# Patient Record
Sex: FEMALE | Race: WHITE
[De-identification: names, ages, dates, MRNs, and addresses within clinical notes are randomized per-mention and may not be internally consistent; named-entity substitution may affect disease eponyms.]

---

## 2017-01-04 ENCOUNTER — HOSPITAL ENCOUNTER (OUTPATIENT)
Dept: HOSPITAL 80 - FIMAGING | Age: 72
End: 2017-01-04
Attending: INTERNAL MEDICINE
Payer: COMMERCIAL

## 2017-01-04 DIAGNOSIS — Z78.0: ICD-10-CM

## 2017-01-04 DIAGNOSIS — Z90.5: ICD-10-CM

## 2017-01-04 DIAGNOSIS — Z90.710: ICD-10-CM

## 2017-01-04 DIAGNOSIS — M85.80: ICD-10-CM

## 2017-01-04 DIAGNOSIS — Z13.820: Primary | ICD-10-CM

## 2017-01-04 NOTE — DX
DEXA Bone Mineral Densitometry    

 

Clinical Indications:  Postmenopausal, post hysterectomy and nephrectomy, screening for osteoporosis

 

Comparison: None

 

Technique:  Bone Mineral Densitometry (BMD) by Dual Energy X-Ray Absorptiometry (DEXA) was performed 
utilizing the [employer] scanner.  The lumbar spine was evaluated in the AP projection.  The bilat
eral hips and  forearm were evaluated in the AP projection. Vertebral fracture assessment was also pe
rformed.

 

AP Lumbar Spine:  The L1 and L2 vertebral bodies were evaluated.  L3 and L4 are excluded due to degen
erative sclerosis.

BMD:  0.988 gm/cm2 

T-score:  -1.5 SD 

Z-score: 0.5 SD

 

 

AP Left Hip: Neck

BMD:  0.764 gm/cm2

T-score:  -2.0 SD 

Z-score:  0 SD

 

 

AP Right Hip: Neck

BMD:  0.733 gm/cm2

T-score:  -2.2 SD 

Z-score:  -0.2 SD

 

 

AP Left Forearm, 1/3:  

BMD:  0.725 gm/cm2

T-score:  -1.7 SD

Z-score:  0.4 SD 

 

 

Vertebral Fracture Assessment:  No significant fracture deformity. Atherosclerotic calcification of t
he abdominal aorta likely increases lumbar BMD.

 

Conclusion: Considering the lowest measured site, the patient  has low bone density. 

The ten year FRAX risk for any major osteoporotic fracture  is 13.2% and for a hip fracture is 3.4%.A
ccording to the recommendations of the National Osteoporosis Foundation, this patient would be a good
 candidate for bone strengthening pharmacologic intervention.

Any bone loss in this patient is probably related to aging or estrogen deficiency.

 

To prevent osteoporosis and to promote the patient's bone density, the following recommendations rafaela
ld be considered:

1.  Pursue a regular regimen of weightbearing and muscle strengthening exercises in order to reduce t
he risk of falls and fractures (as tolerated by the patient's general medical condition).

2. Ensure that daily dietary calcium uptake is maximized.

3. Consider checking the serum vitamin D level. Ensure that intake of vitamin D is   800 IU per day (
for ages 71 and older).

4.  Consider follow-up DEXA scan in one year if the patient begins pharmacologic intervention, or in 
2 years if she follows a more conservative approach, to assess the rate of bone loss in this patient.

## 2017-11-28 ENCOUNTER — HOSPITAL ENCOUNTER (OUTPATIENT)
Dept: HOSPITAL 80 - FIMAGING | Age: 72
End: 2017-11-28
Attending: INTERNAL MEDICINE
Payer: COMMERCIAL

## 2017-11-28 DIAGNOSIS — Z12.31: Primary | ICD-10-CM

## 2017-11-28 DIAGNOSIS — Z80.3: ICD-10-CM

## 2017-11-28 PROCEDURE — G0202 SCR MAMMO BI INCL CAD: HCPCS

## 2018-06-11 ENCOUNTER — HOSPITAL ENCOUNTER (INPATIENT)
Dept: HOSPITAL 80 - FED | Age: 73
LOS: 4 days | Discharge: HOME | DRG: 690 | End: 2018-06-15
Attending: INTERNAL MEDICINE | Admitting: INTERNAL MEDICINE
Payer: COMMERCIAL

## 2018-06-11 DIAGNOSIS — N30.90: Primary | ICD-10-CM

## 2018-06-11 DIAGNOSIS — F10.20: ICD-10-CM

## 2018-06-11 DIAGNOSIS — R19.7: ICD-10-CM

## 2018-06-11 DIAGNOSIS — K22.2: ICD-10-CM

## 2018-06-11 DIAGNOSIS — N17.9: ICD-10-CM

## 2018-06-11 DIAGNOSIS — T39.395A: ICD-10-CM

## 2018-06-11 DIAGNOSIS — K25.9: ICD-10-CM

## 2018-06-11 DIAGNOSIS — M79.7: ICD-10-CM

## 2018-06-11 DIAGNOSIS — I10: ICD-10-CM

## 2018-06-11 DIAGNOSIS — E86.0: ICD-10-CM

## 2018-06-11 LAB — PLATELET # BLD: 89 10^3/UL (ref 150–400)

## 2018-06-11 PROCEDURE — C1726 CATH, BAL DIL, NON-VASCULAR: HCPCS

## 2018-06-11 RX ADMIN — SODIUM CHLORIDE SCH MLS: 900 INJECTION, SOLUTION INTRAVENOUS at 14:40

## 2018-06-11 NOTE — PDMN
Medical Necessity


Medical necessity: MCG: M160 sepsis and other febrile illness, without focal 

infection:  A-3 days;  dehydration that is sever- CR 3.6, WBC 25, hypotension, 

UTI, Diarrhea,  further monitoring and tx needed anticipate > 2 midnights.

## 2018-06-11 NOTE — GHP
[f rep st]



                                                            HISTORY AND PHYSICAL





DATE OF ADMISSION:  06/11/2018



CHIEF COMPLAINT:  Weakness.



HISTORY OF PRESENT ILLNESS:  A 72-year-old female with a history of hypertension.  She also seems to 
have a history of chronic dysphagia and possibly esophageal stricture, although she on her most recen
t visit to gastrologist, she refused to have an EGD done.  About 10 days ago, she developed significa
nt diarrhea.  She has had decreased p.o. intake.  No nausea or vomiting.  No abdominal pain.  She the
n worsened and was not eating anything or drinking any water.  Toward the last few days, she had some
 dysuria as well.  She is not having any fevers or chills.



REVIEW OF SYSTEMS:  10-point review of systems is negative.



PAST MEDICAL HISTORY:  Hypertension.



MEDICATIONS:  Atenolol.



SOCIAL HISTORY:  No smoking, some alcohol.



FAMILY HISTORY:  Reviewed and noncontributory.



PHYSICAL EXAMINATION:  VITAL SIGNS:  Afebrile.  Blood pressure is 109/65, heart rate 74, oxygen satur
ation 97% on room air.  GENERAL:  The patient is well developed in no apparent distress.  HEENT:  Non
icteric sclerae.  Extraocular movements intact.  Dry mucous membranes.  NECK:  Supple.  No thyromegal
y.  LUNGS:  Good effort.  Clear to auscultation bilaterally.  CARDIOVASCULAR:  Regular rate and rhyth
m.  No murmurs, gallops.  ABDOMEN:  Positive bowel sounds.  Soft, nontender, slightly distended.  No 
hepatosplenomegaly.  EXTREMITIES:  No clubbing, cyanosis, or edema.  SKIN:  Without rash, warm, intac
t.  NEUROLOGIC:  Alert and oriented x3.  Moving all 4 extremities equally.  PSYCH:  Normal mood and a
ffect.



LABS:  White blood cell count 25, hemoglobin 10.  Platelets are 89.  Sodium 131, potassium 93.  BUN i
s 145, creatinine 3.6.  Apparently last year her LFTs were also elevated in an alcohol manner.  UA sh
ows leukocyte esterase 3+, white blood cell count 15,082.



TEST DATA:  EKG shows normal sinus rhythm.  No acute changes.



ASSESSMENT:  This is a 72-year-old female, presenting with diarrhea, urinary tract infection.



PLAN:  

1.  Urinary tract infection.  I do not know if this is the primary problem or this occurred because o
f the dehydration.  We will treat with antibiotics.

2.  Diarrhea.  Will check a GI pathogen panel.

3.  Thrombocytopenia, anemia, elevated liver function tests.  This seems to be consistent with alcoho
l.  I will have to talk to her tomorrow about her alcohol use.  We will recheck her LFTs.  Will also 
check a liver ultrasound.

4.  History of falling.  Will check a vitamin B12 level.  Could be due to alcoholism.  

5.  Hypertension.  Will hold atenolol for today.  Restart when her blood pressure is a little bit bet
ter.



CODE STATUS:  Patient is full code.





Job #:  [id number]

## 2018-06-11 NOTE — EDPHY
H & P


Stated Complaint: n/v/d


Time Seen by Provider: 06/11/18 12:16


HPI/ROS: 





CHIEF COMPLAINT:  Nausea, vomiting, diarrhea, dysuria





HISTORY OF PRESENT ILLNESS:  The patient presents the ED with a 10 day history 

of intermittent diarrhea, dysuria, decreased appetite and dehydration.  The 

patient denies any fever or flank pain.  She vomited once earlier in the week.  

The patient denies any history of recent fall or trauma.  She has no complaints 

of headache, neck pain, difficulty breathing or other acute complaints.  The 

patient has a history of hypertension.  She denies recent surgical history. 





REVIEW OF SYSTEMS:


A comprehensive 10 point review of systems is otherwise negative aside from 

elements mentioned in the history of present illness.








Source: Patient





- Personal History


Current Tetanus/Diphtheria Vaccine: Yes


Current Tetanus Diphtheria and Acellular Pertussis (TDAP): Yes





- Medical/Surgical History


Hx Asthma: No


Hx Chronic Respiratory Disease: No


Hx Diabetes: No


Hx Cardiac Disease: No


Hx Renal Disease: No


Hx Cirrhosis: No


Hx Alcoholism: No


Hx HIV/AIDS: No


Hx Splenectomy or Spleen Trauma: No


Other PMH: HTN,





- Social History


Smoking Status: Never smoked





- Physical Exam


Exam: 





General Appearance:  Elderly female, no acute distress


Eyes:  Pupils equal and round no pallor or injection


ENT, Mouth:  Dry mucous membranes


Respiratory:  There are no retractions, lungs are clear to auscultation


Cardiovascular:  Regular rate and rhythm


Gastrointestinal:  Abdomen is soft and nontender, no masses, bowel sounds normal


Neurological:  5/5 strength all 4 extremities


Skin:  Warm and dry, no rashes


Musculoskeletal:  Neck is supple nontender


Extremities:  symmetrical, full range of motion


Psychiatric:  Patient is oriented X 3, there is no agitation


Constitutional: 


 Initial Vital Signs











Temperature (C)  36.4 C   06/11/18 11:29


 


Heart Rate  81   06/11/18 11:29


 


Respiratory Rate  24 H  06/11/18 11:29


 


Blood Pressure  87/47 L  06/11/18 11:29


 


O2 Sat (%)  96   06/11/18 11:29








 











O2 Delivery Mode               Room Air














Allergies/Adverse Reactions: 


 





No Known Allergies Allergy (Unverified 06/11/18 11:28)


 








Home Medications: 














 Medication  Instructions  Recorded


 


Aspirin [Aspirin 81mg] 81 mg PO DAILY 02/15/12


 


Atenolol  06/11/18


 


Ibuprofen  06/11/18


 


Multivitamins  06/11/18














Medical Decision Making


ED Course/Re-evaluation: 





The patient presents to the ED with dehydration in the setting of a urinary 

tract infection.  The patient is afebrile and in no acute distress.  The 

patient does have leukocytosis but no fever, tachypnea or tachycardia.  She has 

a reassuring initial venous lactate.  Clinical presentation is one consistent 

with dehydration and UTI and not severe sepsis.  The patient did have blood 

cultures obtained is emergency department.  She received aggressive fluid 

rehydration.  The patient is started on IV ceftriaxone.





Her EKG demonstrates no evidence of an arrhythmia.





Consultation is made with Dr. Garcia from the hospitalist service who will 

admit the patient.








Differential Diagnosis: 





Differential diagnosis considered includes dehydration, metabolic abnormality, 

renal failure, severe sepsis, pyelonephritis


Critical Care Time: 





Critical care time exclusive of procedures and exclusive of the PA's time was 

35 minutes, performed by myself, Ozzy Saul MD.  The patient presents to 

the ED with dehydration, acute renal failure and a urinary tract infection.  

She required aggressive fluid rehydration in the emergency department.  Patient 

will be admitted to the hospital in a setting of her acute renal failure and 

infection.











- Data Points


Laboratory Results: 


 Laboratory Results





 06/11/18 12:00 





 06/11/18 12:00 





 











  06/11/18 06/11/18 06/11/18





  12:40 12:00 12:00


 


WBC      





    


 


RBC      





    


 


Hgb      





    


 


Hct      





    


 


MCV      





    


 


MCH      





    


 


MCHC      





    


 


RDW      





    


 


Plt Count      





    


 


MPV      





    


 


Neut % (Auto)      





    


 


Lymph % (Auto)      





    


 


Mono % (Auto)      





    


 


Eos % (Auto)      





    


 


Baso % (Auto)      





    


 


Nucleat RBC Rel Count      





    


 


Absolute Neuts (auto)      





    


 


Absolute Lymphs (auto)      





    


 


Absolute Monos (auto)      





    


 


Absolute Eos (auto)      





    


 


Absolute Basos (auto)      





    


 


Absolute Nucleated RBC      





    


 


Immature Gran %      





    


 


Seg Neutrophils %      





    


 


Band Neutrophils %      





    


 


Lymphocytes %      





    


 


Monocytes %      





    


 


Eosinophils %      





    


 


Basophils %      





    


 


Metamyelocytes %      





    


 


Myelocytes %      





    


 


Promyelocytes %      





    


 


Blast Cells %      





    


 


Immature Gran #      





    


 


Absolute Seg Neuts      





    


 


Absolute Band Neuts      





    


 


Absolute Lymphocytes      





    


 


Absolute Monocytes      





    


 


Absolute Eosinophils      





    


 


Absolute Basophils      





    


 


Absolute Metamyelocyte      





    


 


Absolute Myelocytes      





    


 


Absolute Promyelocytes      





    


 


Absolute Plasma Cells      





    


 


RBC/WBC/PLT Morphology      





    


 


Absolute Blast Cells      





    


 


Plasma Cells %      





    


 


Platelet Estimate      





    


 


VBG Lactic Acid  1.8 mmol/L mmol/L    





   (0.7-2.1)   


 


Sodium      131 mEq/L L mEq/L





     (135-145) 


 


Potassium      3.3 mEq/L mEq/L





     (3.3-5.0) 


 


Chloride      93 mEq/L L mEq/L





     () 


 


Carbon Dioxide      18 mEq/l L mEq/l





     (22-31) 


 


Anion Gap      20 mEq/L H mEq/L





     (8-16) 


 


BUN      145 mg/dL H* mg/dL





     (7-23) 


 


Creatinine      3.6 mg/dL H mg/dL





     (0.6-1.0) 


 


Estimated GFR      12 





    


 


Glucose      139 mg/dL H mg/dL





     () 


 


Calcium      8.4 mg/dL L mg/dL





     (8.5-10.4) 


 


Urine Color    YELLOW   





    


 


Urine Appearance    MODERATELY TURBID   





    


 


Urine pH    5.0   





    (5.0-7.5)  


 


Ur Specific Gravity    1.010   





    (1.002-1.030)  


 


Urine Protein    NEGATIVE   





    (NEGATIVE)  


 


Urine Ketones    NEGATIVE   





    (NEGATIVE)  


 


Urine Blood    2+  H   





    (NEGATIVE)  


 


Urine Nitrate    NEGATIVE   





    (NEGATIVE)  


 


Urine Bilirubin    NEGATIVE   





    (NEGATIVE)  


 


Urine Urobilinogen    NEGATIVE EU EU  





    (0.2-1.0)  


 


Ur Leukocyte Esterase    3+  H   





    (NEGATIVE)  


 


Urine RBC    3-5 /hpf H /hpf  





    (0-3)  


 


Urine WBC     /hpf H /hpf  





    (0-3)  


 


Ur Epithelial Cells    TRACE /lpf /lpf  





    (NONE-1+)  


 


Urine Bacteria    3+ /hpf H /hpf  





    (NONE SEEN)  


 


Urine Glucose    NEGATIVE   





    (NEGATIVE)  














  06/11/18





  12:00


 


WBC  25.72 10^3/uL H 10^3/uL





   (3.80-9.50) 


 


RBC  3.13 10^6/uL L 10^6/uL





   (4.18-5.33) 


 


Hgb  10.9 g/dL L g/dL





   (12.6-16.3) 


 


Hct  30.0 % L %





   (38.0-47.0) 


 


MCV  95.8 fL fL





   (81.5-99.8) 


 


MCH  34.8 pg H pg





   (27.9-34.1) 


 


MCHC  36.3 g/dL g/dL





   (32.4-36.7) 


 


RDW  13.8 % %





   (11.5-15.2) 


 


Plt Count  89 10^3/uL L 10^3/uL





   (150-400) 


 


MPV  10.5 fL fL





   (8.7-11.7) 


 


Neut % (Auto)  Not Reported 





  


 


Lymph % (Auto)  Not Reported 





  


 


Mono % (Auto)  Not Reported 





  


 


Eos % (Auto)  Not Reported 





  


 


Baso % (Auto)  Not Reported 





  


 


Nucleat RBC Rel Count  Not Reported 





  


 


Absolute Neuts (auto)  Not Reported 





  


 


Absolute Lymphs (auto)  Not Reported 





  


 


Absolute Monos (auto)  Not Reported 





  


 


Absolute Eos (auto)  Not Reported 





  


 


Absolute Basos (auto)  Not Reported 





  


 


Absolute Nucleated RBC  Not Reported 





  


 


Immature Gran %  Not Reported 





  


 


Seg Neutrophils %  84.3 % %





  


 


Band Neutrophils %  6.9 % %





  


 


Lymphocytes %  6.8 % %





  


 


Monocytes %  1.0 % %





  


 


Eosinophils %  0 % %





  


 


Basophils %  0 % %





  


 


Metamyelocytes %  1.0 % %





  


 


Myelocytes %  0 % %





  


 


Promyelocytes %  0 % %





  


 


Blast Cells %  0 % %





  


 


Immature Gran #  Not Reported 





  


 


Absolute Seg Neuts  21.68 10^/uL H 10^/uL





   (1.70-6.50) 


 


Absolute Band Neuts  1.77 10^3/uL H 10^3/uL





   (0.00-0.70) 


 


Absolute Lymphocytes  1.75 10^3/uL 10^3/uL





   (1.00-3.00) 


 


Absolute Monocytes  0.26 10^3/uL L 10^3/uL





   (0.30-0.80) 


 


Absolute Eosinophils  0.00 10^3/uL L 10^3/uL





   (0.03-0.40) 


 


Absolute Basophils  0.00 10^3/uL L 10^3/uL





   (0.02-0.10) 


 


Absolute Metamyelocyte  0.26 10^3/mL H 10^3/mL





   (0.00-0.00) 


 


Absolute Myelocytes  0.00 10^3/mL 10^3/mL





   (0.00-0.00) 


 


Absolute Promyelocytes  0.00 10^3/uL 10^3/uL





   (0.00-0.00) 


 


Absolute Plasma Cells  0.00 10^3/uL 10^3/uL





   (0.00-0.00) 


 


RBC/WBC/PLT Morphology  NORMAL 





   (NORMAL) 


 


Absolute Blast Cells  0.00 10^3/uL 10^3/uL





   (0.00-0.00) 


 


Plasma Cells %  0 % %





  


 


Platelet Estimate  DECREASED  L 





   (ADEQ) 


 


VBG Lactic Acid  





  


 


Sodium  





  


 


Potassium  





  


 


Chloride  





  


 


Carbon Dioxide  





  


 


Anion Gap  





  


 


BUN  





  


 


Creatinine  





  


 


Estimated GFR  





  


 


Glucose  





  


 


Calcium  





  


 


Urine Color  





  


 


Urine Appearance  





  


 


Urine pH  





  


 


Ur Specific Gravity  





  


 


Urine Protein  





  


 


Urine Ketones  





  


 


Urine Blood  





  


 


Urine Nitrate  





  


 


Urine Bilirubin  





  


 


Urine Urobilinogen  





  


 


Ur Leukocyte Esterase  





  


 


Urine RBC  





  


 


Urine WBC  





  


 


Ur Epithelial Cells  





  


 


Urine Bacteria  





  


 


Urine Glucose  





  











Medications Given: 


 





Ceftriaxone Sodium/Dextrose (Rocephin 1 Gm (Premix))  50 mls @ 100 mls/hr IV 

EDNOW ONE


   PRN Reason: Protocol


   Stop: 06/11/18 13:33


   Last Admin: 06/11/18 13:13 Dose:  50 mls





Discontinued Medications





Sodium Chloride (Ns)  1,000 mls @ 3,000 mls/hr IV ONCE ONE


   Stop: 06/11/18 12:02


   Last Admin: 06/11/18 11:59 Dose:  1,000 mls


Sodium Chloride (Ns)  1,000 mls @ 0 mls/hr IV EDNOW ONE; Wide Open


   PRN Reason: Protocol


   Stop: 06/11/18 13:04


   Last Admin: 06/11/18 13:13 Dose:  1,000 mls








Departure





- Departure


Disposition: [facility] Inpatient Acute


Clinical Impression: 


Urinary tract infection


Qualifiers:


 Urinary tract infection type: site unspecified Hematuria presence: without 

hematuria Qualified Code(s): N39.0 - Urinary tract infection, site not specified





Acute renal failure


Qualifiers:


 Acute renal failure type: unspecified Qualified Code(s): N17.9 - Acute kidney 

failure, unspecified





Condition: Fair

## 2018-06-11 NOTE — CPEKG
Heart Rate: 69

RR Interval: 870

P-R Interval: 184

QRSD Interval: 84

QT Interval: 384

QTC Interval: 412

P Axis: 62

QRS Axis: 20

T Wave Axis: 49

EKG Severity - NORMAL ECG -

EKG Impression: SINUS RHYTHM

Electronically Signed By: Ozzy Saul 11-Jun-2018 14:09:40

## 2018-06-12 LAB
INR PPP: 1.5 (ref 0.83–1.16)
PLATELET # BLD: 80 10^3/UL (ref 150–400)
PROTHROMBIN TIME: 18.3 SEC (ref 12–15)

## 2018-06-13 PROCEDURE — 0DB58ZX EXCISION OF ESOPHAGUS, VIA NATURAL OR ARTIFICIAL OPENING ENDOSCOPIC, DIAGNOSTIC: ICD-10-PCS | Performed by: INTERNAL MEDICINE

## 2018-06-13 PROCEDURE — 0D758ZZ DILATION OF ESOPHAGUS, VIA NATURAL OR ARTIFICIAL OPENING ENDOSCOPIC: ICD-10-PCS | Performed by: INTERNAL MEDICINE

## 2018-06-13 PROCEDURE — 0DB68ZX EXCISION OF STOMACH, VIA NATURAL OR ARTIFICIAL OPENING ENDOSCOPIC, DIAGNOSTIC: ICD-10-PCS | Performed by: INTERNAL MEDICINE

## 2018-06-13 RX ADMIN — ATENOLOL SCH: 25 TABLET ORAL at 05:48

## 2018-06-13 RX ADMIN — ATENOLOL SCH: 25 TABLET ORAL at 07:26

## 2018-06-13 RX ADMIN — IRON SUPPLEMENT SCH MG: 325 TABLET ORAL at 21:03

## 2018-06-13 RX ADMIN — PANTOPRAZOLE SODIUM SCH MG: 40 TABLET, DELAYED RELEASE ORAL at 21:03

## 2018-06-13 RX ADMIN — SODIUM CHLORIDE SCH MLS: 900 INJECTION, SOLUTION INTRAVENOUS at 13:02

## 2018-06-13 RX ADMIN — SODIUM CHLORIDE SCH MLS: 900 INJECTION, SOLUTION INTRAVENOUS at 23:26

## 2018-06-13 RX ADMIN — PANTOPRAZOLE SODIUM SCH MG: 40 TABLET, DELAYED RELEASE ORAL at 11:25

## 2018-06-13 NOTE — GIREPORT
[facility]

Surgical Services - Endoscopy Department

_____________________________________________________________________________________________________
_______

Patient Name: Nathalia West                          Procedure Date: 6/13/2018 9:11 AM

MRN: [id number]                                       Account Number: [id number]

Patient Type: Inpatient                              Attending  MD/ ER Physician: Emanuel Harvey MD

_____________________________________________________________________________________________________
_______

 

Procedure:                    Upper GI endoscopy

Indications:                  Note dictated, consult appreciated. Dysphagia. Also with decreased oral
 

                              intake, dehydration. Uses ibuprofen, aspirin.

Providers:                    Emanuel Harvey MD

Referring MD:                 Ellie Jarvis MD

Medicines:                    Fentanyl 100 micrograms IV, Midazolam 3 mg IV

Complications:                No immediate complications.

Description of Procedure:     After obtaining informed consent, the endoscope was passed under direct
 

                              vision. Throughout the procedure, the patient's blood pressure, pulse, 
and 

                              oxygen saturations were monitored continuously. The Endoscope was intro
duced 

                              through the mouth, and advanced to the second part of duodenum.

Findings:                     One stenosis was found at the gastroesophageal junction. Measured 1.3 c
m 

                              (inner diameter). The stenosis was traversed. A guide wire was placed, 
then 

                              the scope was withdrawn. Using the wire as a guide, dilation with a 15 
-18 

                              mm balloon dilator was performed to 18 mm, with a good result. Biopsies
 done 

                              of the stricture, as well as upper and lower esophagus.

                              Two non-bleeding cratered gastric ulcers with no stigmata of bleeding w
ere 

                              found in the gastric antrum. The largest lesion was 10 mm in largest 

                              dimension, deeply cratered, with surrounding edema. Several small erosi
ons 

                              nearby. Biopsies were taken with a cold forceps for Helicobacter pylori
 

                              testing from the antrum and cardia.

                              The examined duodenum was normal.

Estimated Blood Loss:         Estimated blood loss: none.

Post Op Diagnosis:            - Esophageal stricture, as above. Dilated fully. Most likely from past 


                              reflux disease.

                              Otherwise, several ulcers, one being large, found in the antrum. Suspec
t due 

                              to ibuprofen use. Suspect this actually might have been the cause of mo
st of 

                              her presenting symptoms.

                              She does complain of some nasal mucous symptoms; suspect ENT in etiolog
y, 

                              and nondigestive.

Recommendation:               - feed

                              - PPI bid

                              - Await pathology results, to r/o H. pylori, eosinophilic esophagitis, 
etc.

                              I will sign off. I will f/u on bxs.

                              Else, as an outpt., recommend: a) generic PPI bid for three weeks, then
 

                              daily longterm, b) no further ibuprofen; tylenol or a low-dose narcotic
 prn 

                              o.k., c) aspirin o.k., if felt needed, but would make it a baby aspirin
, d) 

                              iron replacement therapy x 8 weeks, and e) f/u PCP.

                              - Thank you for allowing me to help in the management of this patient.

Attending Participation:

     I personally performed the entire procedure.

 

Candice Castellanos MD

______________

Emanuel Harvey MD

6/13/2018 10:39:23 AM

This report has been signed electronicallyPeter MD Candice

Number of Addenda: 0

 

Note Initiated On: 6/13/2018 9:11 AM

[email or web address]

## 2018-06-13 NOTE — GCON
[f 
rep st]



                                                                    CONSULTATION





GASTROENTEROLOGY INPATIENT CONSULTATION



DATE OF CONSULTATION:  06/13/2018



I was kindly requested to see the patient by Dr. Ellie Jarvis in consultation for 
a chief complaint of dysphagia.  She is a 72-year-old white female who has had 
a long history of the above, for several years.  She used to have trouble with 
heartburn, but not presently.  She will get food was stuck in her mid chest. 



She was admitted to the hospital with decreased oral intake and dehydration, 
and found to have an elevated creatinine.  She does use ibuprofen.  She is also 
on aspirin. 



Besides the above, she does complain of some nasal congestion and sinus 
drainage.



PAST MEDICAL HISTORY:  

1.  As above.

2.  Hypertension.

3.  Otherwise, noncontributory.



MEDICATIONS:  Outpatient medications include the above.  Inpatient medications 
include ceftriaxone, Tenormin, and IV fluids.



ALLERGIES:  No known drug allergies.



SOCIAL HISTORY:  She states she drinks 2 glasses of wine daily.



FAMILY HISTORY:  Negative for similar dysphagia.



REVIEW OF SYSTEMS:  Positive pertinent review of systems as per my HPI.  
Otherwise, complete review of systems is negative.



PHYSICAL EXAM:  CONSTITUTIONAL:  Nontoxic, pleasant woman.  SKIN:  Warm, dry.  
EYES:  Pupils equal, round, and reactive to light and accommodation.  EARS, NOSE
, MOUTH, AND THROAT:  Oropharynx without masses, moist mucosa.  CARDIOVASCULAR:
  Normal S2, normal PMI.  RESPIRATORY:  Lungs clear to auscultation and 
percussion anteriorly.  GASTROINTESTINAL:  Abdomen soft, nontender.  NEUROLOGIC
:  Grossly nonfocal, cranial nerves grossly intact.  PSYCHIATRIC:  Orientation, 
insight appropriate.  MUSCULOSKELETAL:  Strength grossly normal throughout, 
normal station.



LABORATORIES:  Include [place] [address], which has decreased.  Hematocrit 
24.5%.  Platelet count 80,000.  Prothrombin time 18.3 with an INR of 1.5.  BUN 
108.  Albumin 2.0.  Normal B12 level.  Positive urinalysis.



ASSESSMENT:  

1.  Dysphagia.  Suspect due to an acid peptic stricture, from her past 
heartburn.

2.  Decreased oral intake, dehydration.  The above could be playing a role.  
With her ibuprofen use, peptic ulcer disease is also possible.



PLAN:  

1.  Upper endoscopy with dilation.  Certainly, with her renal failure, anemia, 
elevated white count, urinary tract infection, etc., she is at increased risk 
for this procedure.  However, suspected benefits outweigh the risks and suspect 
she would do well.

2.  Further management depending on the above. 



Thank you for allowing me to help in the care of this patient.





Job #:  [id number]

MTDD

## 2018-06-13 NOTE — HOSPPROG
Hospitalist Progress Note


Assessment/Plan: 





*  ARF - suspect hypovolemia


   -continue IVF


   -very elevated BUN c/w GIB


*  NSAID induced ulcer


   -PPI BID x 3 weeks, then daily


   -biopsy pending


   -DC ibuprofen


*  Esophageal stricture due to GERD s/p dilation


*  UTI


   -IV ceftriaxone - check culture


*  HTN


   -atenolol


*  Acute blood loss anemia


   -iron supplement x 8 weeks











Subjective: No new complaints.


Objective: 


 Vital Signs











Temp Pulse Resp BP Pulse Ox


 


 36.6 C   74   18   113/52 L  95 


 


 06/13/18 11:18  06/13/18 11:18  06/13/18 11:18  06/13/18 11:18  06/13/18 11:18








 Laboratory Results





 06/13/18 11:30 





 











 06/12/18 06/13/18 06/14/18





 05:59 05:59 05:59


 


Intake Total 2350  


 


Output Total 400  


 


Balance 1950  








 











PT  18.3 SEC (12.0-15.0)  H  06/12/18  05:30    


 


INR  1.50  (0.83-1.16)  H  06/12/18  05:30    








d/w Dr. smith regarding yesterday events


abd us - no cirrhosis





- Physical Exam


Constitutional: no apparent distress, appears nourished, not in pain


Cardiovascular: regular rate and rhythym, no murmur, rub, or gallop


Respiratory: no respiratory distress, no rales or rhonchi, clear to auscultation


Gastrointestinal: normoactive bowel sounds, soft, non-tender abdomen, no 

palpable masses


Skin: no rashes or abrasions, no fluctuance, no induration


Neurologic: AAOx3, sensation intact bilaterally


Psychiatric: interacting appropriately, not anxious, not encephalopathic, 

thought process linear





ICD10 Worksheet


Patient Problems: 


 Problems











Problem Status Onset


 


Acute renal failure Acute  


 


Urinary tract infection Acute

## 2018-06-13 NOTE — ASMTCMCOM
CM Note

 

CM Note                       

Notes:

DC needs unclear, pt went to surgery. CM  will follow

 

Date Signed:  06/13/2018 02:42 PM

Electronically Signed By:Denisha Shah RN

## 2018-06-14 LAB — PLATELET # BLD: 187 10^3/UL (ref 150–400)

## 2018-06-14 RX ADMIN — ATENOLOL SCH MG: 25 TABLET ORAL at 08:09

## 2018-06-14 RX ADMIN — PANTOPRAZOLE SODIUM SCH MG: 40 TABLET, DELAYED RELEASE ORAL at 08:10

## 2018-06-14 RX ADMIN — IRON SUPPLEMENT SCH MG: 325 TABLET ORAL at 20:21

## 2018-06-14 RX ADMIN — PANTOPRAZOLE SODIUM SCH MG: 40 TABLET, DELAYED RELEASE ORAL at 20:21

## 2018-06-14 RX ADMIN — IRON SUPPLEMENT SCH MG: 325 TABLET ORAL at 08:09

## 2018-06-14 RX ADMIN — SODIUM CHLORIDE SCH MLS: 900 INJECTION, SOLUTION INTRAVENOUS at 18:17

## 2018-06-14 NOTE — HOSPPROG
Hospitalist Progress Note


Assessment/Plan: 





*  ARF - suspect hypovolemia


   -continue IVF


   -very elevated BUN c/w GIB


*  NSAID induced ulcer


   -PPI BID x 3 weeks, then daily


   -biopsy pending


   -DC ibuprofen


*  Esophageal stricture due to GERD s/p dilation


*  UTI


   -IV ceftriaxone - check culture


*  HTN


   -atenolol


*  Acute blood loss anemia


   -iron supplement x 8 weeks











Subjective: Getting better daily


Objective: 


 Vital Signs











Temp Pulse Resp BP Pulse Ox


 


 36.4 C   76   18   130/58 H  96 


 


 06/14/18 15:10  06/14/18 15:10  06/14/18 15:10  06/14/18 15:10  06/14/18 15:10








 Laboratory Results





 06/14/18 05:20 





 06/14/18 05:20 





 











 06/13/18 06/14/18 06/15/18





 05:59 05:59 05:59


 


Intake Total  1100 300


 


Output Total  700 


 


Balance  400 300








 











PT  18.3 SEC (12.0-15.0)  H  06/12/18  05:30    


 


INR  1.50  (0.83-1.16)  H  06/12/18  05:30    














- Physical Exam


Constitutional: no apparent distress, appears nourished, not in pain


Cardiovascular: regular rate and rhythym, no murmur, rub, or gallop


Respiratory: no respiratory distress, no rales or rhonchi, clear to auscultation


Gastrointestinal: normoactive bowel sounds, soft, non-tender abdomen, no 

palpable masses


Skin: no rashes or abrasions, no fluctuance, no induration


Neurologic: AAOx3, sensation intact bilaterally


Psychiatric: interacting appropriately, not anxious, not encephalopathic, 

thought process linear





ICD10 Worksheet


Patient Problems: 


 Problems











Problem Status Onset


 


Acute renal failure Acute  


 


Urinary tract infection Acute

## 2018-06-15 VITALS — SYSTOLIC BLOOD PRESSURE: 135 MMHG | DIASTOLIC BLOOD PRESSURE: 68 MMHG

## 2018-06-15 LAB
INR PPP: 1.34 (ref 0.83–1.16)
PLATELET # BLD: 235 10^3/UL (ref 150–400)
PROTHROMBIN TIME: 16.8 SEC (ref 12–15)

## 2018-06-15 RX ADMIN — IRON SUPPLEMENT SCH MG: 325 TABLET ORAL at 08:25

## 2018-06-15 RX ADMIN — PANTOPRAZOLE SODIUM SCH MG: 40 TABLET, DELAYED RELEASE ORAL at 08:26

## 2018-06-15 RX ADMIN — ATENOLOL SCH MG: 25 TABLET ORAL at 08:26

## 2018-06-15 NOTE — ASMTCMCOM
CM Note

 

CM Note                       

Notes:

This is a late entry from [facility]time on 6/12:



Pt is a 73 y/o female admitted for severe sepsis, acute renal failure and UTI. Needs are TBD at 

this time. CM to follow.





Plan: TBD

 

Date Signed:  06/15/2018 08:54 AM

Electronically Signed By:Iwona De La Cruz RN

## 2018-06-15 NOTE — GDS
[f rep st]



                                                             DISCHARGE SUMMARY





DISCHARGE DIAGNOSES:  

1.  Uncomplicated cystitis.

2.  Acute kidney injury. 

3.  Esophageal stricture, status post dilation.

4.  Nonsteroidal anti-inflammatory drug-induced ulcer.

5.  Alcohol dependence.

6.  Macrocytic anemia.

7.  Fibromyalgia.



PROCEDURES:  EGD, 06/13/2018:  

1.  Esophageal stricture dilated fully.  

2.  Nonbleeding crater gastric ulcers with no stigmata of bleeding in the antrum.



HISTORY OF PRESENT ILLNESS:  A 72-year-old female with a history of hypertension, with history of chr
onic dysphagia, possible esophageal stricture, presented with significant diarrhea.  She has had decr
eased oral intake.  Denies nausea, vomiting.  Denies melena or hematochezia.  Takes a significant surjit
unt of ibuprofen for fibromyalgia.



HOSPITAL COURSE:  

1.  Uncomplicated cystitis:  Culture was negative.  She completed 2 doses of ceftriaxone and lost acc
ess, thus completed course with 1-time dose of Levaquin.  She is currently asymptomatic.

2.  MICHAEL.  This is multifactorial with diarrhea and significant ibuprofen use.  Her creatinine improve
d from 3.6 at admission, now 1.7.  She was advised to avoid any NSAIDs.

3.  Esophageal stricture.  This was dilated by Dr. Harvey.

4.  NSAID-induced ulcer.  Negative H pylori.  Advised to use a PPI b.i.d. for 3 weeks and then daily.
  Will also complete 8 weeks of iron supplementation.

5.  Alcohol dependence.  Was counseled on cessation.

6.  Microcytic anemia.  H and H today were 7.5 and 22.  I advised the patient to repeat an H and H; h
owever, she declined, knowing that I cannot determine if transfusion warranted without this informati
on.

7.  Leukocytosis, improved from 25, now 14.  She is currently afebrile.

8.  Patient is stable.

9.  Hypertension.  Resume atenolol.



DISPOSITION:  Patient is stable for discharge home with her daughter.



NEW MEDICATIONS:  

1.  Ferrous sulfate b.i.d.  

2.  Protonix 40 mg b.i.d. for 3 weeks, then 40 daily thereafter.



FOLLOWUP:  

1.  Dr. Jarvis, her PCP, next week, on Wednesday.

2.  GI.





Job #:  [id number]

## 2018-06-15 NOTE — ASMTCMCOM
CM Note

 

CM Note                       

Notes:

Spoke w/pt re; dc poc. Advised pt that PT/OT recommend homecare. Pt states that she lives in a 1 

level home, has lived alone "forever" and her daughter lives only 3 minutes away. Pt declines the 

need for homecare, if she needs it, she says she will call her pcp. CM available for any changes.



Dc Plan: Independent



 

 

Date Signed:  06/15/2018 11:01 AM

Electronically Signed By:Denisha Shah RN

## 2018-06-15 NOTE — HOSPPROG
Hospitalist Progress Note


Assessment/Plan: 








#UTI: culture negative





#MICHAEL: multifactorial. Improving. Cr 1.7 ( BL 0.6)





#Esophageal stricture: dilated 6/13. Biopsies with no pathologic features





#NSAID-induced ulcer: negative H pylori.  PPI BID x 3 wks, then daily. 8 wks 

iron supplementation





#Etoh dependence





#Macrocytic anemia: Etoh abuse





#DC today. see dictation for full assessment





Subjective: no dizziness, CP or SOB


Objective: 


 Vital Signs











Temp Pulse Resp BP Pulse Ox


 


 36.6 C   79   12   135/68 H  95 


 


 06/15/18 07:22  06/15/18 08:26  06/15/18 07:22  06/15/18 08:26  06/15/18 07:22








 Laboratory Results





 06/15/18 05:15 





 06/15/18 05:15 





 











 06/14/18 06/15/18 06/16/18





 05:59 05:59 05:59


 


Intake Total 1100 1960 


 


Output Total 700  


 


Balance 400 1960 








 











PT  16.8 SEC (12.0-15.0)  H  06/15/18  05:15    


 


INR  1.34  (0.83-1.16)  H  06/15/18  05:15    














- Time Spent With Patient


Time Spent with Patient: greater than 35 minutes


Time Spent with Patient: Greater than 35 minutes spent on this patients care, 

greater than 50% of time spent counseling, educating, and coordinating care 

regarding the above mentioned plan.





- Physical Exam


Constitutional: no apparent distress


Eyes: PERRL


Ears, Nose, Mouth, Throat: moist mucous membranes


Cardiovascular: regular rate and rhythym


Respiratory: no respiratory distress


Gastrointestinal: normoactive bowel sounds, soft, non-tender abdomen, no 

palpable masses


Genitourinary: no bladder fullness


Skin: warm


Musculoskeletal: full muscle strength


Neurologic: AAOx3





ICD10 Worksheet


Patient Problems: 


 Problems











Problem Status Onset


 


Acute renal failure Acute  


 


Urinary tract infection Acute

## 2019-01-03 ENCOUNTER — HOSPITAL ENCOUNTER (OUTPATIENT)
Dept: HOSPITAL 80 - FIMAGING | Age: 74
End: 2019-01-03
Attending: INTERNAL MEDICINE
Payer: COMMERCIAL

## 2019-01-03 DIAGNOSIS — Z12.31: Primary | ICD-10-CM

## 2019-01-03 DIAGNOSIS — Z80.3: ICD-10-CM
